# Patient Record
(demographics unavailable — no encounter records)

---

## 2024-11-01 NOTE — RISK ASSESSMENT
[de-identified] : see sw assessment [Have you ever fainted, passed out or had an unexplained seizure suddenly and without warning, especially during exercise or in response] : Have you ever fainted, passed out or had an unexplained seizure suddenly and without warning, especially during exercise or in response to sudden loud noises such as doorbells, alarm clocks and ringing telephones? No [Have you ever had exercise-related chest pain or shortness of breath?] : Have you ever had exercise-related chest pain or shortness of breath? No [Has anyone in your immediate family (parents, grandparents, siblings) or other more distant relatives (aunts, uncles, cousins)  of heart] : Has anyone in your immediate family (parents, grandparents, siblings) or other more distant relatives (aunts, uncles, cousins)  of heart problems or had an unexpected sudden death before age 50 (This would include unexpected drownings, unexplained car accidents in which the relative was driving or sudden infant death syndrome.)? No [Are you related to anyone with hypertrophic cardiomyopathy or hypertrophic obstructive cardiomyopathy, Marfan syndrome, arrhythmogenic] : Are you related to anyone with hypertrophic cardiomyopathy or hypertrophic obstructive cardiomyopathy, Marfan syndrome, arrhythmogenic right ventricular cardiomyopathy, long QT syndrome, short QT syndrome, Brugada syndrome or catecholaminergic polymorphic ventricular tachycardia, or anyone younger than 50 years with a pacemaker or implantable defibrillator? No [No Increased risk of SCA or SCD] : No Increased risk of SCA or SCD

## 2024-11-01 NOTE — DISCUSSION/SUMMARY
[Normal Growth] : growth [Normal Development] : development  [No Elimination Concerns] : elimination [Continue Regimen] : feeding [No Skin Concerns] : skin [Normal Sleep Pattern] : sleep [None] : no medical problems [Anticipatory Guidance Given] : Anticipatory guidance addressed as per the history of present illness section [Physical Growth and Development] : physical growth and development [Social and Academic Competence] : social and academic competence [Emotional Well-Being] : emotional well-being [Risk Reduction] : risk reduction [Violence and Injury Prevention] : violence and injury prevention [No Vaccines] : no vaccines needed [No Medications] : ~He/She~ is not on any medications [Patient] : patient [Parent/Guardian] : Parent/Guardian [Full Activity without restrictions including Physical Education & Athletics] : Full Activity without restrictions including Physical Education & Athletics [FreeTextEntry1] :  17  y.o. male presents to health center for Physical  V/S stable  No complaints at this time  Denies H/O concussion  Mom reports Kobi father passed away from complicates of Diabetes a few year ago  Denies anxiety, depression and self harm Reviewed student questionnaire, HEEADS and CRAFFTS with student  Counseling/education provided on health maintenance and promotion  Immunizations reviewed  VIS and consent given for influenza F/U for lab results  Medical certificate provided  Safe D/C to class

## 2024-11-01 NOTE — HISTORY OF PRESENT ILLNESS
[Up to date] : Up to date [Eats meals with family] : does not eat meals with family [Has family members/adults to turn to for help] : has family members/adults to turn to for help [Is permitted and is able to make independent decisions] : Is permitted and is able to make independent decisions [Sleep Concerns] : no sleep concerns [Grade: ____] : Grade: [unfilled] [Normal Performance] : normal performance [Normal Behavior/Attention] : normal behavior/attention [Normal Homework] : normal homework [Eats regular meals including adequate fruits and vegetables] : eats regular meals including adequate fruits and vegetables [Drinks non-sweetened liquids] : does not drink non-sweetened liquids  [Calcium source] : calcium source [Has concerns about body or appearance] : does not have concerns about body or appearance [Has friends] : has friends [At least 1 hour of physical activity a day] : at least 1 hour of physical activity a day [Screen time (except homework) less than 2 hours a day] : no screen time (except homework) less than 2 hours a day [Has interests/participates in community activities/volunteers] : has interests/participates in community activities/volunteers. [Uses electronic nicotine delivery system] : does not use electronic nicotine delivery system [Exposure to electronic nicotine delivery system] : exposure to electronic nicotine delivery system [Uses tobacco] : does not use tobacco [Exposure to tobacco] : exposure to tobacco [Uses drugs] : does not use drugs  [Exposure to drugs] : no exposure to drugs [Drinks alcohol] : does not drink alcohol [Exposure to alcohol] : no exposure to alcohol [Uses safety belts/safety equipment] : uses safety belts/safety equipment  [Has peer relationships free of violence] : has peer relationships free of violence [Yes] : Patient has had sexual intercourse. [Has ways to cope with stress] : has ways to cope with stress [Displays self-confidence] : displays self-confidence [Has problems with sleep] : does not have problems with sleep [Gets depressed, anxious, or irritable/has mood swings] : does not get depressed, anxious, or irritable/has mood swings [Has thought about hurting self or considered suicide] : has not thought about hurting self or considered suicide [With Parent/Guardian] : parent/guardian [NO] : No [FreeTextEntry1] : 17 y.o male presents to health center for physical Requesting medical certificate for football  Accompanied by mom to health center  NKDA Denies SX PMH childhood asthma, has not uses inhaler since elementary school

## 2024-11-08 NOTE — HISTORY OF PRESENT ILLNESS
[FreeTextEntry6] : 17 y.o male presents to health center for results No complaints at this time, feels well

## 2024-12-13 NOTE — HISTORY OF PRESENT ILLNESS
[FreeTextEntry6] : 17 y.o male presents to [facility] for repeat fast lab work  No complaints at this time, feels well Did not eat yet, has lunch at this time

## 2024-12-13 NOTE — DISCUSSION/SUMMARY
[FreeTextEntry1] : 17 y.o male presents to [facility] center for fasting lipid panel and repeat CBC V/S stable  Observed after blood drawn Feels well  Offered snack, declined Discussed health maintenance  D/C to lunch, advise to eat  RTC for results

## 2024-12-18 NOTE — HISTORY OF PRESENT ILLNESS
[FreeTextEntry6] : 17 y.o male presents to health center to review results  No complaints at this time, feels well

## 2024-12-18 NOTE — DISCUSSION/SUMMARY
[FreeTextEntry1] : 17  y.o. presents to health center to review lab results V/S stable  No complaints at this time  Lab results reviewed with student Counseling/education provided on health maintenance,  promotion and dietary needs Answered all questions and concerns  RTC for repeat blood work in 6 months, or sooner with any concerns   D/C to class

## 2025-05-14 NOTE — DISCUSSION/SUMMARY
[FreeTextEntry1] : 18 y.o male presents to health center with right shoulder pain  V/S stable  NKDA Motrin PO given, tolerated  Counseling/education provided on management and follow up with ortho  Answered all questions D/C to class

## 2025-05-14 NOTE — HISTORY OF PRESENT ILLNESS
[FreeTextEntry6] : 18 y.o presents to health center with right shoulder pain  Was lifting weights yesterday and shoulder popped out H/O shoulder dislocation in the pass  Kobi reports shoulder moved back into place with minimum rotation/ manipulation  NKDA Arm sling in place for support  Did not take anything for pain

## 2025-05-14 NOTE — HISTORY OF PRESENT ILLNESS
[de-identified] : 18-year-old male comes in today with his mom for an evaluation of the right shoulder pain and injury that occurred on May 13.  Patient states yesterday he was lifting weights doing  presses and his shoulder came out.  He states that it went back in itself.  He states this is the third incidence, last time it happened was last summer.  He is wearing a sling.  Took Motrin.

## 2025-05-14 NOTE — ASSESSMENT
[FreeTextEntry1] : Sling provided today.  No overhead lifting pushing pulling.  Over-the-counter anti-inflammatory as needed.  Recommending an MRI right shoulder given history of prior shoulder subluxations.  Follow-up in our sports medicine department

## 2025-05-14 NOTE — REVIEW OF SYSTEMS
[Negative] : Genitourinary [Myalgia] : no myalgia [Restriction of Motion] : no restriction of motion [Bone Deformity] : no bone deformity [Swelling of Joint] : no swelling of joint [Changes in Gait] : no changes in gait [FreeTextEntry1] : pain to right shoulder

## 2025-05-14 NOTE — IMAGING
[de-identified] : On examination of the right shoulder he is able to forward flex, abduct to approximate 160 degrees with pain.  Negative drop arm.  Weakness to rotator cuff resistance.  Negative drop arm.  Positive Gaitan, positive [place]'s.  Good range of motion of the elbow hand wrist and fingers.  X-ray right shoulder 3 views obtained in the office today no obvious fracture or dislocation

## 2025-06-12 NOTE — HISTORY OF PRESENT ILLNESS
[de-identified] : Patient is here for evaluation of right shoulder pain Affecting quality of life Wakes up at night due to pain  has had 3 shoulder dislocations, p[lays football and lacrosse  NAD Right shoulder: TTP ant GH joint, bicipital groove FF 0-175 ER 60 IR T12 5/5 strength scapular abduction, ER, IR Pos Impingement Pos Hong Pos Cross Arm Adduction pos instability  mri right shoudler ant inf labral tear, hill sachs  plan went over findings explained the imaging and tx explained need for surgery due to recurrent dislocations and explained prognosis with nonop will cont cons tx for now while they think about sx pt script fu in 2-3 months, if not sooner    negative...

## 2025-06-13 NOTE — HISTORY OF PRESENT ILLNESS
[FreeTextEntry6] : 18 y.o male presents to [facility] for repeat lab work  [place] reports fasting this morning  No complaints at this time, feels well

## 2025-06-13 NOTE — DISCUSSION/SUMMARY
[FreeTextEntry1] : 18 y.o presents to [facility] for repeat CBC and lipid profile V/S stable  Lab work completed  Snack and water provided  Answered all questions  Will follow up Monday for results D/C to class